# Patient Record
Sex: MALE | Race: WHITE | Employment: FULL TIME | URBAN - METROPOLITAN AREA
[De-identification: names, ages, dates, MRNs, and addresses within clinical notes are randomized per-mention and may not be internally consistent; named-entity substitution may affect disease eponyms.]

---

## 2024-08-22 ENCOUNTER — OFFICE VISIT (OUTPATIENT)
Dept: URGENT CARE | Facility: PHYSICIAN GROUP | Age: 39
End: 2024-08-22
Payer: COMMERCIAL

## 2024-08-22 VITALS
OXYGEN SATURATION: 97 % | HEART RATE: 70 BPM | RESPIRATION RATE: 15 BRPM | WEIGHT: 315 LBS | SYSTOLIC BLOOD PRESSURE: 128 MMHG | BODY MASS INDEX: 40.43 KG/M2 | HEIGHT: 74 IN | DIASTOLIC BLOOD PRESSURE: 80 MMHG | TEMPERATURE: 97.7 F

## 2024-08-22 DIAGNOSIS — M54.16 LUMBAR RADICULOPATHY: ICD-10-CM

## 2024-08-22 PROCEDURE — 99213 OFFICE O/P EST LOW 20 MIN: CPT | Mod: 25 | Performed by: PHYSICIAN ASSISTANT

## 2024-08-22 RX ORDER — KETOROLAC TROMETHAMINE 15 MG/ML
15 INJECTION, SOLUTION INTRAMUSCULAR; INTRAVENOUS ONCE
Status: COMPLETED | OUTPATIENT
Start: 2024-08-22 | End: 2024-08-22

## 2024-08-22 RX ORDER — METHYLPREDNISOLONE 4 MG
TABLET, DOSE PACK ORAL
Qty: 21 TABLET | Refills: 0 | Status: SHIPPED | OUTPATIENT
Start: 2024-08-22

## 2024-08-22 RX ORDER — CYCLOBENZAPRINE HCL 10 MG
10 TABLET ORAL 3 TIMES DAILY PRN
Qty: 30 TABLET | Refills: 0 | Status: SHIPPED | OUTPATIENT
Start: 2024-08-22

## 2024-08-22 RX ADMIN — KETOROLAC TROMETHAMINE 15 MG: 15 INJECTION, SOLUTION INTRAMUSCULAR; INTRAVENOUS at 10:16

## 2024-08-22 ASSESSMENT — ENCOUNTER SYMPTOMS
WEAKNESS: 0
CONSTIPATION: 0
BACK PAIN: 1
ABDOMINAL PAIN: 0
TINGLING: 0
FALLS: 0
CHILLS: 0
FEVER: 0
DIARRHEA: 0
MYALGIAS: 1
FLANK PAIN: 0

## 2024-08-22 NOTE — LETTER
August 22, 2024    To Whom It May Concern:         This is confirmation that David Agosto attended his scheduled appointment with Willem Pollack P.A.-C. on 8/22/24.  Please excuse the patient's absence from work on 8/21/2024 through 8/23/2024.         If you have any questions please do not hesitate to call me at the phone number listed below.    Sincerely,          Willem Pollack P.A.-C.  [phone]

## 2024-08-22 NOTE — PROGRESS NOTES
Subjective:     CHIEF COMPLAINT  Chief Complaint   Patient presents with    Back Pain     X2days Lower R side back shoot down both legs       HPI  David Agosto is a very pleasant 39 y.o. male who presents to the clinic with low back pain x 2 days.  Pain started after he was moving his couch at home.  He went to lift up the couch and felt an immediate sharp pain to the right lumbar region.  Pain has been persistent over the last 2 days.  Describes a tightness and aching sensation primarily to the right lumbar region.  He has noted occasional radicular shooting pains down the posterior aspects of both legs not extending past the knee.  Has been taking Tylenol and ibuprofen without any significant relief.  Denies any saddle anesthesia or change in bowel or bladder function.  No prior history of back injury or surgery.    REVIEW OF SYSTEMS  Review of Systems   Constitutional:  Negative for chills, fever and malaise/fatigue.   Gastrointestinal:  Negative for abdominal pain, constipation and diarrhea.   Genitourinary:  Negative for dysuria, flank pain, frequency, hematuria and urgency.   Musculoskeletal:  Positive for back pain and myalgias. Negative for falls.   Neurological:  Negative for tingling and weakness.       PAST MEDICAL HISTORY  There are no problems to display for this patient.      SURGICAL HISTORY  patient denies any surgical history    ALLERGIES  No Known Allergies    CURRENT MEDICATIONS  Home Medications       Reviewed by Willem Pollack P.A.-C. (Physician Assistant) on 08/22/24 at 1005  Med List Status: <None>     Medication Last Dose Status   meloxicam (MOBIC) 15 MG tablet Not Taking Active                    SOCIAL HISTORY  Social History     Tobacco Use    Smoking status: Never    Smokeless tobacco: Never   Substance and Sexual Activity    Alcohol use: Not on file    Drug use: Not on file    Sexual activity: Not on file       FAMILY HISTORY  History reviewed. No pertinent family history.        "Objective:     VITAL SIGNS: /80   Pulse 70   Temp 36.5 °C (97.7 °F) (Temporal)   Resp 15   Ht 1.88 m (6' 2\")   Wt (!) 143 kg (315 lb)   SpO2 97%   BMI 40.44 kg/m²     PHYSICAL EXAM  Physical Exam  Constitutional:       General: He is not in acute distress.     Appearance: Normal appearance. He is not ill-appearing, toxic-appearing or diaphoretic.   HENT:      Head: Normocephalic and atraumatic.   Eyes:      Conjunctiva/sclera: Conjunctivae normal.   Pulmonary:      Effort: Pulmonary effort is normal.   Musculoskeletal:      Cervical back: Normal range of motion.      Comments: Lumbar exam: No midline tenderness to palpation.  No obvious deformity or step-off.  Tenderness present over the right lumbar paraspinal muscles.  Mild muscle spasm present.  Maintains full lumbar flexion, extension and rotation.  Lumbar flexion aggravates pain.  Negative SLRs bilaterally.  Lower extremity strength and sensation full and intact.  Normal gait.   Skin:     Findings: No bruising, erythema or rash.   Neurological:      General: No focal deficit present.      Mental Status: He is alert and oriented to person, place, and time. Mental status is at baseline.         Assessment/Plan:     1. Lumbar radiculopathy  - ketorolac (Toradol) 15 MG/ML injection 15 mg  - methylPREDNISolone (MEDROL DOSEPAK) 4 MG Tablet Therapy Pack; Follow schedule on package instructions.  Dispense: 21 Tablet; Refill: 0  - cyclobenzaprine (FLEXERIL) 10 mg Tab; Take 1 Tablet by mouth 3 times a day as needed for Muscle Spasms or Moderate Pain.  Dispense: 30 Tablet; Refill: 0      MDM/Comments:    -Take medication as prescribed. Discussed sedative effects of muscle relaxers.  -Can take OTC Tylenol as directed for pain.   -Temperature Therapy: Heat or ice, whichever feels better.  -Gentle ROM back stretches and exercises. Resume activity as tolerated.  -Follow up with your primary care doctor.  -Follow up for persistent, new, or worsening pain. " Emergently for weakness, loss of bowel or bladder, groin pain or numbness, fevers.    Differential diagnosis, natural history, supportive care, and indications for immediate follow-up discussed. All questions answered. Patient agrees with the plan of care.    Follow-up as needed if symptoms worsen or fail to improve to PCP, Urgent care or Emergency Room.    I have personally reviewed prior external notes and test results pertinent to today's visit.  I have independently reviewed and interpreted all diagnostics ordered during this urgent care acute visit.   Discussed management options (risks,benefits, and alternatives to treatment). Pt expresses understanding and the treatment plan was agreed upon. Questions were encouraged and answered to pt's satisfaction.    Please note that this dictation was created using voice recognition software. I have made a reasonable attempt to correct obvious errors, but I expect that there are errors of grammar and possibly content that I did not discover before finalizing the note.